# Patient Record
Sex: FEMALE | Race: WHITE | NOT HISPANIC OR LATINO | URBAN - METROPOLITAN AREA
[De-identification: names, ages, dates, MRNs, and addresses within clinical notes are randomized per-mention and may not be internally consistent; named-entity substitution may affect disease eponyms.]

---

## 2022-08-22 ENCOUNTER — APPOINTMENT (OUTPATIENT)
Dept: RADIOLOGY | Facility: MEDICAL CENTER | Age: 37
End: 2022-08-22
Attending: EMERGENCY MEDICINE
Payer: COMMERCIAL

## 2022-08-22 ENCOUNTER — HOSPITAL ENCOUNTER (EMERGENCY)
Facility: MEDICAL CENTER | Age: 37
End: 2022-08-23
Attending: EMERGENCY MEDICINE
Payer: COMMERCIAL

## 2022-08-22 DIAGNOSIS — R10.11 RIGHT UPPER QUADRANT ABDOMINAL PAIN: ICD-10-CM

## 2022-08-22 DIAGNOSIS — K59.00 CONSTIPATION, UNSPECIFIED CONSTIPATION TYPE: ICD-10-CM

## 2022-08-22 DIAGNOSIS — F31.12 BIPOLAR AFFECTIVE DISORDER, CURRENTLY MANIC, MODERATE (HCC): ICD-10-CM

## 2022-08-22 DIAGNOSIS — R11.0 NAUSEA: ICD-10-CM

## 2022-08-22 DIAGNOSIS — N63.10 MASS OF RIGHT BREAST: ICD-10-CM

## 2022-08-22 DIAGNOSIS — F41.1 GENERALIZED ANXIETY DISORDER: ICD-10-CM

## 2022-08-22 DIAGNOSIS — K82.8 GALLBLADDER SLUDGE: ICD-10-CM

## 2022-08-22 LAB
ALBUMIN SERPL BCP-MCNC: 3.9 G/DL (ref 3.2–4.9)
ALBUMIN/GLOB SERPL: 1.2 G/DL
ALP SERPL-CCNC: 61 U/L (ref 30–99)
ALT SERPL-CCNC: 48 U/L (ref 2–50)
ANION GAP SERPL CALC-SCNC: 12 MMOL/L (ref 7–16)
APPEARANCE UR: CLEAR
AST SERPL-CCNC: 40 U/L (ref 12–45)
BASOPHILS # BLD AUTO: 0.6 % (ref 0–1.8)
BASOPHILS # BLD: 0.05 K/UL (ref 0–0.12)
BILIRUB SERPL-MCNC: <0.2 MG/DL (ref 0.1–1.5)
BILIRUB UR QL STRIP.AUTO: NEGATIVE
BUN SERPL-MCNC: 13 MG/DL (ref 8–22)
CALCIUM SERPL-MCNC: 8.9 MG/DL (ref 8.4–10.2)
CHLORIDE SERPL-SCNC: 104 MMOL/L (ref 96–112)
CO2 SERPL-SCNC: 22 MMOL/L (ref 20–33)
COLOR UR: YELLOW
CREAT SERPL-MCNC: 0.77 MG/DL (ref 0.5–1.4)
EOSINOPHIL # BLD AUTO: 0.14 K/UL (ref 0–0.51)
EOSINOPHIL NFR BLD: 1.7 % (ref 0–6.9)
ERYTHROCYTE [DISTWIDTH] IN BLOOD BY AUTOMATED COUNT: 37.6 FL (ref 35.9–50)
GFR SERPLBLD CREATININE-BSD FMLA CKD-EPI: 102 ML/MIN/1.73 M 2
GLOBULIN SER CALC-MCNC: 3.3 G/DL (ref 1.9–3.5)
GLUCOSE SERPL-MCNC: 143 MG/DL (ref 65–99)
GLUCOSE UR STRIP.AUTO-MCNC: 250 MG/DL
HCG SERPL QL: NEGATIVE
HCT VFR BLD AUTO: 41.6 % (ref 37–47)
HGB BLD-MCNC: 13.2 G/DL (ref 12–16)
IMM GRANULOCYTES # BLD AUTO: 0.06 K/UL (ref 0–0.11)
IMM GRANULOCYTES NFR BLD AUTO: 0.7 % (ref 0–0.9)
KETONES UR STRIP.AUTO-MCNC: NEGATIVE MG/DL
LEUKOCYTE ESTERASE UR QL STRIP.AUTO: NEGATIVE
LIPASE SERPL-CCNC: 82 U/L (ref 7–58)
LYMPHOCYTES # BLD AUTO: 3.09 K/UL (ref 1–4.8)
LYMPHOCYTES NFR BLD: 37.2 % (ref 22–41)
MCH RBC QN AUTO: 26.7 PG (ref 27–33)
MCHC RBC AUTO-ENTMCNC: 31.7 G/DL (ref 33.6–35)
MCV RBC AUTO: 84 FL (ref 81.4–97.8)
MICRO URNS: ABNORMAL
MONOCYTES # BLD AUTO: 0.72 K/UL (ref 0–0.85)
MONOCYTES NFR BLD AUTO: 8.7 % (ref 0–13.4)
NEUTROPHILS # BLD AUTO: 4.25 K/UL (ref 2–7.15)
NEUTROPHILS NFR BLD: 51.1 % (ref 44–72)
NITRITE UR QL STRIP.AUTO: NEGATIVE
NRBC # BLD AUTO: 0 K/UL
NRBC BLD-RTO: 0 /100 WBC
PH UR STRIP.AUTO: 7 [PH] (ref 5–8)
PLATELET # BLD AUTO: 308 K/UL (ref 164–446)
PMV BLD AUTO: 9.8 FL (ref 9–12.9)
POTASSIUM SERPL-SCNC: 3.6 MMOL/L (ref 3.6–5.5)
PROT SERPL-MCNC: 7.2 G/DL (ref 6–8.2)
PROT UR QL STRIP: NEGATIVE MG/DL
RBC # BLD AUTO: 4.95 M/UL (ref 4.2–5.4)
RBC UR QL AUTO: NEGATIVE
SODIUM SERPL-SCNC: 138 MMOL/L (ref 135–145)
SP GR UR STRIP.AUTO: 1.02
WBC # BLD AUTO: 8.3 K/UL (ref 4.8–10.8)

## 2022-08-22 PROCEDURE — 74018 RADEX ABDOMEN 1 VIEW: CPT

## 2022-08-22 PROCEDURE — 85025 COMPLETE CBC W/AUTO DIFF WBC: CPT

## 2022-08-22 PROCEDURE — 81003 URINALYSIS AUTO W/O SCOPE: CPT

## 2022-08-22 PROCEDURE — 700105 HCHG RX REV CODE 258: Performed by: EMERGENCY MEDICINE

## 2022-08-22 PROCEDURE — 96375 TX/PRO/DX INJ NEW DRUG ADDON: CPT

## 2022-08-22 PROCEDURE — 96374 THER/PROPH/DIAG INJ IV PUSH: CPT

## 2022-08-22 PROCEDURE — 80053 COMPREHEN METABOLIC PANEL: CPT

## 2022-08-22 PROCEDURE — 83690 ASSAY OF LIPASE: CPT

## 2022-08-22 PROCEDURE — 84703 CHORIONIC GONADOTROPIN ASSAY: CPT

## 2022-08-22 PROCEDURE — 36415 COLL VENOUS BLD VENIPUNCTURE: CPT

## 2022-08-22 PROCEDURE — 700111 HCHG RX REV CODE 636 W/ 250 OVERRIDE (IP): Performed by: EMERGENCY MEDICINE

## 2022-08-22 PROCEDURE — 99285 EMERGENCY DEPT VISIT HI MDM: CPT

## 2022-08-22 RX ORDER — BISACODYL 5 MG
10 TABLET, DELAYED RELEASE (ENTERIC COATED) ORAL ONCE
Status: COMPLETED | OUTPATIENT
Start: 2022-08-23 | End: 2022-08-23

## 2022-08-22 RX ORDER — KETOROLAC TROMETHAMINE 30 MG/ML
30 INJECTION, SOLUTION INTRAMUSCULAR; INTRAVENOUS ONCE
Status: COMPLETED | OUTPATIENT
Start: 2022-08-22 | End: 2022-08-22

## 2022-08-22 RX ORDER — CLONAZEPAM 1 MG/1
1 TABLET ORAL 2 TIMES DAILY
COMMUNITY

## 2022-08-22 RX ORDER — SODIUM CHLORIDE 9 MG/ML
INJECTION, SOLUTION INTRAVENOUS CONTINUOUS
Status: DISCONTINUED | OUTPATIENT
Start: 2022-08-22 | End: 2022-08-23 | Stop reason: HOSPADM

## 2022-08-22 RX ORDER — LORAZEPAM 1 MG/1
1 TABLET ORAL ONCE
Status: COMPLETED | OUTPATIENT
Start: 2022-08-23 | End: 2022-08-23

## 2022-08-22 RX ORDER — ONDANSETRON 2 MG/ML
4 INJECTION INTRAMUSCULAR; INTRAVENOUS ONCE
Status: COMPLETED | OUTPATIENT
Start: 2022-08-22 | End: 2022-08-22

## 2022-08-22 RX ADMIN — SODIUM CHLORIDE: 9 INJECTION, SOLUTION INTRAVENOUS at 23:05

## 2022-08-22 RX ADMIN — ONDANSETRON 4 MG: 2 INJECTION INTRAMUSCULAR; INTRAVENOUS at 23:05

## 2022-08-22 RX ADMIN — KETOROLAC TROMETHAMINE 30 MG: 30 INJECTION, SOLUTION INTRAMUSCULAR; INTRAVENOUS at 23:05

## 2022-08-22 ASSESSMENT — PAIN DESCRIPTION - DESCRIPTORS: DESCRIPTORS: BURNING

## 2022-08-23 VITALS
RESPIRATION RATE: 16 BRPM | SYSTOLIC BLOOD PRESSURE: 148 MMHG | WEIGHT: 258.38 LBS | HEIGHT: 66 IN | DIASTOLIC BLOOD PRESSURE: 87 MMHG | HEART RATE: 109 BPM | TEMPERATURE: 97.3 F | OXYGEN SATURATION: 96 % | BODY MASS INDEX: 41.52 KG/M2

## 2022-08-23 PROCEDURE — A9270 NON-COVERED ITEM OR SERVICE: HCPCS | Performed by: EMERGENCY MEDICINE

## 2022-08-23 PROCEDURE — 76705 ECHO EXAM OF ABDOMEN: CPT

## 2022-08-23 PROCEDURE — 700102 HCHG RX REV CODE 250 W/ 637 OVERRIDE(OP): Performed by: EMERGENCY MEDICINE

## 2022-08-23 RX ORDER — ZINC SULFATE 50(220)MG
220 CAPSULE ORAL DAILY
COMMUNITY

## 2022-08-23 RX ORDER — MULTIVIT WITH MINERALS/LUTEIN
TABLET ORAL
COMMUNITY

## 2022-08-23 RX ORDER — METFORMIN HYDROCHLORIDE 500 MG/1
500 TABLET, EXTENDED RELEASE ORAL DAILY
COMMUNITY

## 2022-08-23 RX ORDER — TRAZODONE HYDROCHLORIDE 50 MG/1
50 TABLET ORAL ONCE
Status: COMPLETED | OUTPATIENT
Start: 2022-08-23 | End: 2022-08-23

## 2022-08-23 RX ORDER — VITAMIN B COMPLEX
1000 TABLET ORAL DAILY
COMMUNITY

## 2022-08-23 RX ORDER — TRAZODONE HYDROCHLORIDE 50 MG/1
100 TABLET ORAL ONCE
Status: COMPLETED | OUTPATIENT
Start: 2022-08-23 | End: 2022-08-23

## 2022-08-23 RX ORDER — SPIRONOLACTONE 25 MG/1
25 TABLET ORAL DAILY
COMMUNITY

## 2022-08-23 RX ADMIN — BISACODYL 10 MG: 5 TABLET, COATED ORAL at 00:24

## 2022-08-23 RX ADMIN — TRAZODONE HYDROCHLORIDE 100 MG: 50 TABLET ORAL at 01:13

## 2022-08-23 RX ADMIN — LORAZEPAM 1 MG: 1 TABLET ORAL at 00:24

## 2022-08-23 RX ADMIN — TRAZODONE HYDROCHLORIDE 50 MG: 50 TABLET ORAL at 01:13

## 2022-08-23 NOTE — ED TRIAGE NOTES
"Patient presents with 6-month history of reoccurring RUQ/ \"side\" pain. Notices that spicey and acidic foods trigger this \"burning\" pain. Has started taking \"heartburn tablets\". Still has gallbladder.  "

## 2022-08-23 NOTE — ED NOTES
Introduced self and RN role. Oriented pt. Room and how to get help. Pt verbalizes understanding. IV placed, labs drawn. Advised NPO. Pt verbalizes understanding.  Warm blanket given.

## 2022-08-23 NOTE — DISCHARGE INSTRUCTIONS
You have several problems that need to be addressed.  First and foremost is your lack of sleep and you need to be seen by your psychiatrist to have your medications adjusted so that you can sleep properly.  Not sleeping can cause your brain to have issues called psychosis.  You have gallbladder sludge which is thick bile in your gallbladder which is the same as having gallstones.  You will need to be seen by a general surgeon for evaluation to have your gallbladder taken out.  Follow a low-fat diet.  You have a palpable right breast mass which needs to be evaluated by mammogram and ultrasound to be sure that you do not have a malignancy.  Please schedule an appointment with your gynecologist to be scheduled for a mammogram.  Please take your medications for your anxiety and depression regularly.

## 2022-08-23 NOTE — ED PROVIDER NOTES
"ED Provider Note     Scribed for Kendra Baugh D.O. by Becky Olsen. 8/22/2022, 10:18 PM.     Primary care provider: Pcp Pt States None  Means of arrival: Walk-in         History obtained from: Patient   History limited by: None     CHIEF COMPLAINT  Chief Complaint   Patient presents with    Abdominal Pain       HPI  Patricia Ramos is a 37 y.o. female who presents to the emergency Department for evaluation of right upper quadrant abdominal pain onset last night. Patient describes her pain as sharp and burning. Patient states that she was feeling anxious and was yelling when she \"felt like my gallbladder popped\". She notes that it felt like it \"hardened up\". She states that her mom had a cholecystectomy done and reports a current history of fatty liver disease. She denies any allergies to medications. Patient also reports a history of anxiety and depression. She adds that she is currently taking psychiatric medication. She admits to associated symptoms of nausea, diarrhea, constipation, loss of appetite, and radiating bilateral flank pain, but denies vomiting, fevers, suicidal or homicidal ideation. She states that she took 2 over the counter heartburn tablets with minimal relief.   Patient also states that she has not slept for the last 4 nights.  She came up here from [facility] with her friend after she had told her family that she was going to take an Uber.    Patient also complains of a lump underneath her right breast. She notes that she had her doctor in [place] see it. However, she states that \"they told her not to worry about it\".     REVIEW OF SYSTEMS  Pertinent positives include right upper quadrant abdominal pain, nausea, diarrhea, constipation, loss of appetite, and radiating bilateral flank pain. Pertinent negatives include no vomiting, fevers, suicidal or homicidal ideation.   See HPI for further details. All other systems are negative.    PAST MEDICAL HISTORY  Past Medical History: " "  Diagnosis Date    Anxiety     PCO (polycystic ovaries)     Psychiatric disorder        FAMILY HISTORY  History reviewed. No pertinent family history.    SOCIAL HISTORY  Social History     Tobacco Use    Smoking status: Never    Smokeless tobacco: Never   Vaping Use    Vaping Use: Never used   Substance Use Topics    Alcohol use: Yes     Comment: occasionally    Drug use: Yes     Types: Oral     Comment: marijuana      Social History     Substance and Sexual Activity   Drug Use Yes    Types: Oral    Comment: marijuana       SURGICAL HISTORY  History reviewed. No pertinent surgical history.    CURRENT MEDICATIONS    Current Facility-Administered Medications:     NS infusion, , Intravenous, Continuous, Kendra Baugh D.O., Stopped at 08/23/22 0052    magnesium citrate solution 296 mL, 296 mL, Oral, Once, Kendra Baugh D.O.    Current Outpatient Medications:     metFORMIN ER (GLUCOPHAGE XR) 500 MG TABLET SR 24 HR, Take 500 mg by mouth every day., Disp: , Rfl:     spironolactone (ALDACTONE) 25 MG Tab, Take 25 mg by mouth every day., Disp: , Rfl:     vitamin D3 (CHOLECALCIFEROL) 1000 Unit (25 mcg) Tab, Take 1,000 Units by mouth every day., Disp: , Rfl:     Ascorbic Acid (VITAMIN C) 1000 MG Tab, Take  by mouth., Disp: , Rfl:     multivitamin (THERAGRAN) Tab, Take 1 Tablet by mouth every day., Disp: , Rfl:     zinc sulfate (ZINCATE) 220 (50 Zn) MG Cap, Take 220 mg by mouth every day., Disp: , Rfl:     sertraline (ZOLOFT) 50 MG Tab, Take 50 mg by mouth every day., Disp: , Rfl:     clonazePAM (KLONOPIN) 1 MG Tab, Take 1 mg by mouth 2 times a day., Disp: , Rfl:     ALLERGIES  No Known Allergies    PHYSICAL EXAM  VITAL SIGNS: BP (!) 176/97   Pulse (!) 114   Temp 36.4 °C (97.6 °F) (Temporal)   Resp 16   Ht 1.676 m (5' 6\")   Wt 117 kg (258 lb 6.1 oz)   LMP 07/20/2022 (Approximate)   SpO2 97%   BMI 41.70 kg/m²     Constitutional: Patient is well nourished. Non-toxic appearing. Extremely anxious, Morbidly obese, Mild " distress.  Patient is very manic and paranoid, denies any suicidal or homicidal ideations at this time.  HENT: Normocephalic, atraumatic. Nose normal with no mucosal edema or drainage. Oropharynx moist without erythema or exudates.  Eyes: PERRL, EOMI, Conjunctiva without erythema or exudates.   Neck: Supple   Cardiovascular: Normal heart rate and Regular rhythm. No murmur  Thorax & Lungs: Clear and equal breath sounds with good excursion. No respiratory distress, no rhonchi, wheezing or rales.   Abdomen: Bowel sounds normal in all four quadrants. Soft, morbidly obese with diffuse tenderness increased in right upper quadrant, no rebound , no guarding, no flank tenderness.   Palpable mass in right inferior breast that is nontender, approximately 2cm.  No erythema or warmth.  Skin: Warm, Dry, No erythema, No rashes.   Back: No cervical, thoracic, or lumbosacral tenderness.   Extremities: Peripheral pulses 4/4 No edema, No tenderness  Musculoskeletal: Normal range of motion in all major joints. No tenderness to palpation or major deformities noted.   Neurologic: Alert & oriented x 3, Normal motor function, Normal sensory function,  DTR's 4/4 bilaterally.  Psychiatric: Affect  very odd, judgment is impaired due to insomnia and paranoia.  Mood is manic    DIAGNOSTICS/PROCEDURES    LABS  Results for orders placed or performed during the hospital encounter of 08/22/22   CBC WITH DIFFERENTIAL   Result Value Ref Range    WBC 8.3 4.8 - 10.8 K/uL    RBC 4.95 4.20 - 5.40 M/uL    Hemoglobin 13.2 12.0 - 16.0 g/dL    Hematocrit 41.6 37.0 - 47.0 %    MCV 84.0 81.4 - 97.8 fL    MCH 26.7 (L) 27.0 - 33.0 pg    MCHC 31.7 (L) 33.6 - 35.0 g/dL    RDW 37.6 35.9 - 50.0 fL    Platelet Count 308 164 - 446 K/uL    MPV 9.8 9.0 - 12.9 fL    Neutrophils-Polys 51.10 44.00 - 72.00 %    Lymphocytes 37.20 22.00 - 41.00 %    Monocytes 8.70 0.00 - 13.40 %    Eosinophils 1.70 0.00 - 6.90 %    Basophils 0.60 0.00 - 1.80 %    Immature Granulocytes 0.70  0.00 - 0.90 %    Nucleated RBC 0.00 /100 WBC    Neutrophils (Absolute) 4.25 2.00 - 7.15 K/uL    Lymphs (Absolute) 3.09 1.00 - 4.80 K/uL    Monos (Absolute) 0.72 0.00 - 0.85 K/uL    Eos (Absolute) 0.14 0.00 - 0.51 K/uL    Baso (Absolute) 0.05 0.00 - 0.12 K/uL    Immature Granulocytes (abs) 0.06 0.00 - 0.11 K/uL    NRBC (Absolute) 0.00 K/uL   COMP METABOLIC PANEL   Result Value Ref Range    Sodium 138 135 - 145 mmol/L    Potassium 3.6 3.6 - 5.5 mmol/L    Chloride 104 96 - 112 mmol/L    Co2 22 20 - 33 mmol/L    Anion Gap 12.0 7.0 - 16.0    Glucose 143 (H) 65 - 99 mg/dL    Bun 13 8 - 22 mg/dL    Creatinine 0.77 0.50 - 1.40 mg/dL    Calcium 8.9 8.4 - 10.2 mg/dL    AST(SGOT) 40 12 - 45 U/L    ALT(SGPT) 48 2 - 50 U/L    Alkaline Phosphatase 61 30 - 99 U/L    Total Bilirubin <0.2 0.1 - 1.5 mg/dL    Albumin 3.9 3.2 - 4.9 g/dL    Total Protein 7.2 6.0 - 8.2 g/dL    Globulin 3.3 1.9 - 3.5 g/dL    A-G Ratio 1.2 g/dL   LIPASE   Result Value Ref Range    Lipase 82 (H) 7 - 58 U/L   URINALYSIS,CULTURE IF INDICATED    Specimen: Urine   Result Value Ref Range    Color Yellow     Character Clear     Specific Gravity 1.020 <1.035    Ph 7.0 5.0 - 8.0    Glucose 250 (A) Negative mg/dL    Ketones Negative Negative mg/dL    Protein Negative Negative mg/dL    Bilirubin Negative Negative    Nitrite Negative Negative    Leukocyte Esterase Negative Negative    Occult Blood Negative Negative    Micro Urine Req see below    HCG QUAL SERUM   Result Value Ref Range    Beta-Hcg Qualitative Serum Negative Negative   ESTIMATED GFR   Result Value Ref Range    GFR (CKD-EPI) 102 >60 mL/min/1.73 m 2     Labs reviewed by me    RADIOLOGY/PROCEDURES  US-RUQ   Final Result         1.  Hepatomegaly and echogenic liver, compatible with fatty change versus fibrosis.   2.  Gallbladder sludge      [id number] VIEW   Final Result         1.  Moderate stool in colon favors changes of constipation, otherwise nonspecific bowel gas pattern.        Results and  "radiologist interpretation reviewed by me.     COURSE & MEDICAL DECISION MAKING  Pertinent Labs & Imaging studies reviewed. (See chart for details)    10:18 PM - Patient seen and evaluated at bedside. Ordered for DX abdomen, US RUQ, UA, HCG qual serum, lipase, CBC w/diff, and CMP to evaluate. Patient will be treated with Zofran 4mg injection, Toradol 30mg injection, and NS infusion for her symptoms. Differential diagnoses include, but are not limited to, constipation, anxiety, or cholecystitis. Discussed plan of care with patient. I informed them that labs and imaging will be ordered to evaluate symptoms. Patient is understanding and agreeable with plan.     10:37 PM - Nurse informed me that the patient's mother called and states that patient is in the middle of a \"manic break\". Mom reports that patient won't tell her what's going on. Per mother, patient has 2 friends who have been reporting back to her mother on her \"abnormal\" behavior. Mother also states patient has been diagnosed with bipolar disorder, was recently seen by a psychiatrist and had medication adjustment but mom states that the patient will tell her mother what medications she is on.  Patient recently took some time off from her job secondary to a \"breakdown\" due to too much stress.  Patient states she has not been sleeping and has been extremely anxious.  Her mother lives near [place] in [facility] and the patient normally lives in [place].  She was visiting her mother but then decided she wanted to come up to [place] with a male friend who did not want to bring her up here because of her behavior but because she stated that she was going to take an Uber to [place] he opted to bring her up here to watch her.  Her mother states that she will be up here tomorrow morning and she feels confident that she is in good care with her male friend.    11:30 PM - Patient re-evaluated at beside. Discussed patient's condition and treatment plan. Patient's lab " and radiology results discussed. Informed patient that she is very constipated. The patient understood and is in agreement.  She will be treated with Dulcolax as we do not have any magnesium citrate here in the hospital.    12:23 AM - I reevaluated the patient at bedside. I discussed the patient's diagnostic study results which show she is very constipated. I informed her that we are still waiting on her ultrasound results. I also spoke with her about receiving Ativan and Dulcolax to help her sleep tonight. She informed me that she is staying at her friend's house tonight. I discussed plan for discharge and follow up as outlined below. The patient is stable for discharge at this time and will return for any new or worsening symptoms. Patient verbalizes understanding and support with my plan for discharge. Patient is getting increasingly paranoid and manic at this time.   1:35 am-patient's ultrasound report came back positive for gallbladder sludge with no signs of acute cholecystitis.  She does have a slightly elevated lipase of 82 but otherwise her LFTs are unremarkable.  Patient was notified of these test results.  She was also treated with trazodone 150 mg to help her sleep tonight.    She will be discharged in the care of her male friend with plans for her mother to pick her up in the morning.  She is being discharged in stable and improved condition.  I have asked her to follow-up with general surgery regarding her gallbladder, gynecology regarding her right breast mass which will need a full work-up including mammogram and ultrasound.  In addition to this she is to make sure that she is seen by the psychiatrist to have medication adjustments so that she is getting appropriate sleep which will help with her psychosis and manic episode.      DISPOSITION:  Patient will be discharged home in stable condition.    FOLLOW UP:  Your physician in [place]    Schedule an appointment as soon as possible for a visit in  1 week      OUTPATIENT MEDICATIONS:  Discharge Medication List as of 8/23/2022  1:40 AM          FINAL IMPRESSION  1. Constipation, unspecified constipation type    2. Right upper quadrant abdominal pain    3. Nausea    4. Generalized anxiety disorder    5. Bipolar affective disorder, currently manic, moderate (HCC)    6. Gallbladder sludge    7. Mass of right breast         Becky CHOW (Scribe), am scribing for, and in the presence of, Kendra Baugh D.O..    Electronically signed by: Becky Olsen (Scribe), 8/22/2022    IKendra D.O. personally performed the services described in this documentation, as scribed by Becky Olsen in my presence, and it is both accurate and complete. C.     The note accurately reflects work and decisions made by me.  Kendra Baugh D.O.  8/23/2022  2:08 AM